# Patient Record
Sex: FEMALE | Race: WHITE | URBAN - METROPOLITAN AREA
[De-identification: names, ages, dates, MRNs, and addresses within clinical notes are randomized per-mention and may not be internally consistent; named-entity substitution may affect disease eponyms.]

---
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
## 2019-06-15 ENCOUNTER — EMERGENCY (EMERGENCY)
Facility: HOSPITAL | Age: 19
LOS: 1 days | Discharge: HOME | End: 2019-06-15
Attending: EMERGENCY MEDICINE
Payer: COMMERCIAL

## 2019-06-15 VITALS
HEART RATE: 85 BPM | HEIGHT: 70 IN | SYSTOLIC BLOOD PRESSURE: 125 MMHG | DIASTOLIC BLOOD PRESSURE: 77 MMHG | OXYGEN SATURATION: 100 % | TEMPERATURE: 98 F | WEIGHT: 145.06 LBS | RESPIRATION RATE: 18 BRPM

## 2019-06-15 PROCEDURE — 73130 X-RAY EXAM OF HAND: CPT

## 2019-06-15 PROCEDURE — 73130 X-RAY EXAM OF HAND: CPT | Mod: 26,LT

## 2019-06-15 PROCEDURE — 99283 EMERGENCY DEPT VISIT LOW MDM: CPT

## 2019-06-15 RX ORDER — IBUPROFEN 200 MG
600 TABLET ORAL ONCE
Refills: 0 | Status: COMPLETED | OUTPATIENT
Start: 2019-06-15 | End: 2019-06-15

## 2019-06-15 RX ADMIN — Medication 600 MILLIGRAM(S): at 17:11
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
## 2019-06-15 NOTE — ED PROVIDER NOTE - PHYSICAL EXAMINATION
Well Appearing, Nontoxic, NAD;  Symm Facies, No ext signs trauma, PERRL 3mm, (-)Pallor, MMM;  No midline/colette CTL spine tender, (+)FROM;  RRR w/o m/g/r, equal distal pulses;   CTAB w/o w/r/r, stable no crepitus;   Abd soft, nt/nd, +bs;  L Hand tender/swelling dorsum, +FROM and 5/5 and nvi w/ bcr distally, otherwise No extremity colette/joint tenderness/swelling;  AOX3, Normal speech, CN grossly intact, VF/VA wnl, normal strength/sensation/gait

## 2019-06-15 NOTE — ED PROVIDER NOTE - NSFOLLOWUPINSTRUCTIONS_ED_ALL_ED_FT
See your Primary Doctor this week for follow up -- call to discuss.    Acetaminophen and/or Ibuprofen as needed for pain -- see medication warnings.    Seek immediate medical care for new/worsening symptoms/concerns.
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
## 2023-11-21 NOTE — ED PROVIDER NOTE - CHILD ABUSE FACILITY
Patient: Benji Chaves Date: 2023  : 1963   60 year old female       Chief Complaint   Patient presents with   • Physical        Patient comes in today for physical/preventive care/annual exam.  Patient denies any recent acute illnesses.  Patient has been dealing with some mild shoulder impingement.  Patient is working with the therapy and orthopedic.    Patient also needed a renewal of her as needed [place] paperwork.  That was done.  A copy will be kept in her records.    Patient also asked for refill on hemorrhoid cream to keep on hand as needed.    Lab results were reviewed.        Review of Systems    ROS:    Symptoms include no fever, no chills, no generalized muscle aches, no anorexia, no nasal discharge, no nasal passage blockage, no cough, no wheezing, no dyspnea, no shortness of breath, no chest pain, no hoarseness, no sore throat, no headache, no acute vision changes, no nausea, no vomiting, no diarrhea, no constipation, no abdominal pain, no lower back pain, no dysuria, no change in urinary frequency, no feelings of urinary urgency, no pain, no rash, no suicidal or homicidal ideation, no arthralgias and no localized joint swelling unless mentioned above in hpi.    All other systems reviewed and negative.    Current Outpatient Medications   Medication Sig Dispense Refill   • hydroCORTisone (ANUSOL-HC) 2.5 % rectal cream Place 1 application. rectally in the morning and 1 application. in the evening. 30 g 0   • Black Cohosh 20 MG Tab Take 20 mg by mouth daily. 30 tablet    • calcium-vitamin D (OSCAL-500) 500-400 MG-UNIT Tab  60 tablet    • Probiotic Product (PROBIOTIC-10 PO)      • Multiple Vitamins-Minerals (MULTIVITAL PO)        No current facility-administered medications for this visit.       ALLERGIES:   Allergen Reactions   • Latex HIVES       Past Medical History:   Diagnosis Date   • Atherosclerosis of native arteries of extremities with intermittent claudication, left leg (CMD)     • Breast pain in female 06/25/2019   • Breast pain, right 07/07/2019   • COVID-19     4/2022   • Lumbar radiculopathy    • Mastalgia 08/28/2019   • No known problems    • Osteopenia        Past Surgical History:   Procedure Laterality Date   • No past surgeries         Social History     Socioeconomic History   • Marital status: /Civil Union     Spouse name: Not on file   • Number of children: Not on file   • Years of education: Not on file   • Highest education level: Not on file   Occupational History   • Not on file   Tobacco Use   • Smoking status: Never   • Smokeless tobacco: Never   Substance and Sexual Activity   • Alcohol use: Yes     Alcohol/week: 2.0 standard drinks of alcohol     Types: 2 Glasses of wine per week     Comment: daily    • Drug use: No   • Sexual activity: Yes   Other Topics Concern   • Not on file   Social History Narrative   • Not on file     Social Determinants of Health     Financial Resource Strain: Not on file   Food Insecurity: Not on file   Transportation Needs: Not on file   Physical Activity: Not on file   Stress: Not on file   Social Connections: Not on file   Intimate Partner Violence: Not on file       Family History   Problem Relation Age of Onset   • Cancer, Breast Mother         40s   • Natural Causes Father        Visit Vitals  /70 (BP Location: LUE - Left upper extremity, Patient Position: Sitting, Cuff Size: Small Adult)   Pulse 63   Ht 5' 6\" (1.676 m)   Wt 56.1 kg (123 lb 10.9 oz)   SpO2 98%   BMI 19.96 kg/m²       Physical Exam  Vitals reviewed.   Constitutional:       General: She is not in acute distress.     Appearance: Normal appearance. She is well-developed and normal weight. She is not ill-appearing, toxic-appearing or diaphoretic.   HENT:      Head: Normocephalic and atraumatic.      Right Ear: Tympanic membrane, ear canal and external ear normal. There is no impacted cerumen.      Left Ear: Tympanic membrane, ear canal and external ear normal.  There is no impacted cerumen.      Nose: Nose normal. No congestion or rhinorrhea.      Mouth/Throat:      Mouth: Mucous membranes are moist.      Pharynx: Oropharynx is clear. No oropharyngeal exudate or posterior oropharyngeal erythema.   Eyes:      General: No scleral icterus.        Right eye: No discharge.         Left eye: No discharge.      Extraocular Movements: Extraocular movements intact.      Conjunctiva/sclera: Conjunctivae normal.   Neck:      Trachea: No tracheal deviation.   Cardiovascular:      Rate and Rhythm: Normal rate and regular rhythm.      Heart sounds: Normal heart sounds.   Pulmonary:      Effort: Pulmonary effort is normal. No respiratory distress.      Breath sounds: Normal breath sounds. No wheezing.   Abdominal:      General: Bowel sounds are normal.      Palpations: Abdomen is soft.      Tenderness: There is no abdominal tenderness. There is no rebound.   Musculoskeletal:         General: No tenderness. Normal range of motion.      Cervical back: Normal range of motion and neck supple.      Right lower leg: No edema.      Left lower leg: No edema.   Skin:     General: Skin is warm and dry.      Coloration: Skin is not jaundiced.      Findings: No erythema.   Neurological:      General: No focal deficit present.      Mental Status: She is alert and oriented to person, place, and time. Mental status is at baseline.      Cranial Nerves: No cranial nerve deficit.      Sensory: No sensory deficit.      Motor: No weakness.      Coordination: Coordination normal.      Gait: Gait normal.   Psychiatric:         Mood and Affect: Mood normal.         Behavior: Behavior normal.         Thought Content: Thought content normal.         Judgment: Judgment normal.           Assessment and plan:    Healthcare maintenance  Health maintenance/preventive care.  Physical exam was fairly unremarkable.  Patient educated about appropriate safety mechanisms.  Patient educated about appropriate cardiovascular  exercise.  Patient educated about appropriate oral nutrition.  Patient educated about appropriate sunscreen and sun protection.  Patient educated about appropriate eye doctor visit.  Patient educated about appropriate dentist visit.  No obvious domestic issues noted during interview.  Patient is up-to-date on Pap smear and mammogram.  Patient is up-to-date on Tdap.    Patient is up-to-date on influenza vaccine. Annual influenza vaccine recommended.  Age-appropriate immunizations recommendations made.  Comprehensive blood work panel results reviewed.  Patient being referred to GI for next screening colonoscopy.  Annual physical exams recommended.       Health Maintenance Summary     Colorectal Cancer Screen- (Colonoscopy - Every 10 Years)  Due soon on 1/1/2024    Depression Screening (Yearly)  Due soon on 5/9/2024    Cervical Cancer Screen 30-64 - (PAP/HPV Co-Testing - Every 5 Years)  Next due on 8/21/2024    Breast Cancer Screening (Every 2 Years)  Next due on 7/25/2025    DTaP/Tdap/Td Vaccine (2 - Td or Tdap)  Next due on 1/1/2026    Hepatitis C Screening   Completed    Shingles Vaccine   Completed    Influenza Vaccine   Completed    COVID-19 Vaccine   Completed    Meningococcal Vaccine   Aged Out    Hepatitis B Vaccine (For Physician/[facility] Discussion)   Aged Out    HPV Vaccine   Aged Out    Pneumococcal Vaccine 0-64   Aged Out          - SERVICE TO GASTROENTEROLOGY            Return in about 6 months (around 5/21/2024) for Chronic medical problem follow-up.    Alaina Gamble MD  Primary Care  [facility]   [facility]   [facility]